# Patient Record
Sex: FEMALE | Race: BLACK OR AFRICAN AMERICAN | NOT HISPANIC OR LATINO | Employment: FULL TIME | URBAN - METROPOLITAN AREA
[De-identification: names, ages, dates, MRNs, and addresses within clinical notes are randomized per-mention and may not be internally consistent; named-entity substitution may affect disease eponyms.]

---

## 2020-05-23 PROBLEM — N20.1 URETEROLITHIASIS: Status: ACTIVE | Noted: 2020-05-23

## 2020-06-05 ENCOUNTER — NURSE TRIAGE (OUTPATIENT)
Dept: ADMINISTRATIVE | Facility: CLINIC | Age: 35
End: 2020-06-05

## 2020-06-05 NOTE — TELEPHONE ENCOUNTER
Called patient on behalf of [facility]. Pt denied developing any fever, cough or shortness of breath since the procedure.  Called at [phone].    Reason for Disposition   Follow-up call to recent contact and information only call, no triage required    Protocols used: INFORMATION ONLY CALL - NO TRIAGE-P-[place]